# Patient Record
Sex: FEMALE | Race: OTHER
[De-identification: names, ages, dates, MRNs, and addresses within clinical notes are randomized per-mention and may not be internally consistent; named-entity substitution may affect disease eponyms.]

---

## 2020-01-10 ENCOUNTER — HOSPITAL ENCOUNTER (EMERGENCY)
Dept: HOSPITAL 87 - ER | Age: 30
LOS: 1 days | Discharge: HOME | End: 2020-01-11
Payer: MEDICARE

## 2020-01-10 VITALS — BODY MASS INDEX: 25.88 KG/M2 | WEIGHT: 180.78 LBS | HEIGHT: 70 IN

## 2020-01-10 DIAGNOSIS — F15.10: ICD-10-CM

## 2020-01-10 DIAGNOSIS — N39.0: ICD-10-CM

## 2020-01-10 DIAGNOSIS — E16.2: ICD-10-CM

## 2020-01-10 DIAGNOSIS — F23: Primary | ICD-10-CM

## 2020-01-10 LAB
AMPHETAMINES UR QL SCN: (no result)
APPEARANCE UR: (no result)
BARBITURATES UR QL SCN: NEGATIVE
BENZODIAZ UR QL SCN: (no result)
BZE UR QL SCN: NEGATIVE
CANNABINOIDS UR QL SCN: (no result)
CHLORIDE SERPL-SCNC: 105 MEQ/L (ref 98–107)
COLOR UR: YELLOW
EOSINOPHIL NFR BLD MANUAL: 1 % (ref 0–5)
ERYTHROCYTE [DISTWIDTH] IN BLOOD BY AUTOMATED COUNT: 15.9 % (ref 11.6–14.6)
ETHANOL SERPL-MCNC: < 10 MG/DL
HCG SERPL QL: NEGATIVE
HCT VFR BLD AUTO: 37.4 % (ref 36–48)
HGB BLD-MCNC: 12.3 G/DL (ref 12–16)
HGB UR QL STRIP: NEGATIVE
KETONES UR STRIP-MCNC: (no result) MG/DL
LEUKOCYTE ESTERASE UR QL STRIP: (no result)
LYMPHOCYTES NFR BLD MANUAL: 22 % (ref 20–60)
MCH RBC QN AUTO: 27.6 PG (ref 28–32)
MCV RBC AUTO: 84.3 FL (ref 81–99)
METHADONE UR QL SCN: NEGATIVE
MONOCYTES NFR BLD MANUAL: 23 % (ref 2–8)
NEUTS SEG NFR BLD MANUAL: 54 % (ref 45–75)
NITRITE UR QL STRIP: POSITIVE
OPIATES UR QL SCN: NEGATIVE
PCP UR QL SCN: NEGATIVE
PH UR STRIP: 7 [PH] (ref 4.5–8)
PLATELET # BLD AUTO: 258 X1000/UL (ref 130–400)
PLATELET # BLD EST: NORMAL 10*3/UL
PMV BLD AUTO: 8.8 FL (ref 7.4–10.4)
PROT UR QL STRIP: (no result)
RBC # BLD AUTO: 4.44 MILL/UL (ref 4.2–5.4)
SP GR UR STRIP: 1.02 (ref 1–1.03)
UROBILINOGEN UR STRIP-MCNC: 1 E.U./DL (ref 0.2–1)

## 2020-01-10 PROCEDURE — 80053 COMPREHEN METABOLIC PANEL: CPT

## 2020-01-10 PROCEDURE — G0480 DRUG TEST DEF 1-7 CLASSES: HCPCS

## 2020-01-10 PROCEDURE — 70450 CT HEAD/BRAIN W/O DYE: CPT

## 2020-01-10 PROCEDURE — 96372 THER/PROPH/DIAG INJ SC/IM: CPT

## 2020-01-10 PROCEDURE — 96365 THER/PROPH/DIAG IV INF INIT: CPT

## 2020-01-10 PROCEDURE — 85025 COMPLETE CBC W/AUTO DIFF WBC: CPT

## 2020-01-10 PROCEDURE — 93005 ELECTROCARDIOGRAM TRACING: CPT

## 2020-01-10 PROCEDURE — 80329 ANALGESICS NON-OPIOID 1 OR 2: CPT

## 2020-01-10 PROCEDURE — 84703 CHORIONIC GONADOTROPIN ASSAY: CPT

## 2020-01-10 PROCEDURE — 80320 DRUG SCREEN QUANTALCOHOLS: CPT

## 2020-01-10 PROCEDURE — 81003 URINALYSIS AUTO W/O SCOPE: CPT

## 2020-01-10 PROCEDURE — 96376 TX/PRO/DX INJ SAME DRUG ADON: CPT

## 2020-01-10 PROCEDURE — 80307 DRUG TEST PRSMV CHEM ANLYZR: CPT

## 2020-01-10 PROCEDURE — 82962 GLUCOSE BLOOD TEST: CPT

## 2020-01-10 PROCEDURE — 80305 DRUG TEST PRSMV DIR OPT OBS: CPT

## 2020-01-10 PROCEDURE — 36415 COLL VENOUS BLD VENIPUNCTURE: CPT

## 2020-01-10 PROCEDURE — 99284 EMERGENCY DEPT VISIT MOD MDM: CPT

## 2020-01-10 PROCEDURE — 96366 THER/PROPH/DIAG IV INF ADDON: CPT

## 2020-01-10 PROCEDURE — 96361 HYDRATE IV INFUSION ADD-ON: CPT

## 2020-01-10 PROCEDURE — 81025 URINE PREGNANCY TEST: CPT

## 2020-01-11 VITALS — SYSTOLIC BLOOD PRESSURE: 116 MMHG | DIASTOLIC BLOOD PRESSURE: 63 MMHG

## 2020-11-14 ENCOUNTER — HOSPITAL ENCOUNTER (EMERGENCY)
Dept: HOSPITAL 54 - ER | Age: 30
Discharge: HOME | End: 2020-11-14
Payer: SELF-PAY

## 2020-11-14 VITALS — HEIGHT: 64 IN | BODY MASS INDEX: 23.9 KG/M2 | WEIGHT: 140 LBS

## 2020-11-14 VITALS — DIASTOLIC BLOOD PRESSURE: 78 MMHG | SYSTOLIC BLOOD PRESSURE: 131 MMHG

## 2020-11-14 DIAGNOSIS — F20.9: ICD-10-CM

## 2020-11-14 DIAGNOSIS — Z59.0: ICD-10-CM

## 2020-11-14 DIAGNOSIS — F19.10: Primary | ICD-10-CM

## 2020-11-14 SDOH — ECONOMIC STABILITY - HOUSING INSECURITY: HOMELESSNESS: Z59.0

## 2020-11-14 NOTE — NUR
PT BIBRA C/O BEHAVIORAL ISSUES. PER REPORT, PT HAS HX OF SCHIZOPHRENIA AND HER 
NIECE CALLED 911 BECAUSE SHE WAS WALKING IN THE MIDDLE OF THE STREET WITH 
TRAFFIC. PT DENIES ANY SI OR HI. VS CHECKED. AWAITING MD [place].

## 2020-11-14 NOTE — NUR
PT REFUSING BLOOD DRAW OR ANY STUDIES.  DR SAENZ MADE AWARE. PT WANTS TO 
LEAVE ED.  REFUSING TO SIGN ACI AND HOMELESS WAIVER DISCHARGE.

## 2020-11-16 ENCOUNTER — HOSPITAL ENCOUNTER (EMERGENCY)
Dept: HOSPITAL 54 - ER | Age: 30
LOS: 1 days | Discharge: PSYCHIATRIC OR SUBSTANCE USE FACILITY | End: 2020-11-17
Payer: COMMERCIAL

## 2020-11-16 VITALS — BODY MASS INDEX: 23.9 KG/M2 | WEIGHT: 140 LBS | HEIGHT: 64 IN

## 2020-11-16 DIAGNOSIS — F19.10: ICD-10-CM

## 2020-11-16 DIAGNOSIS — N30.00: ICD-10-CM

## 2020-11-16 DIAGNOSIS — Z20.828: ICD-10-CM

## 2020-11-16 DIAGNOSIS — F20.9: ICD-10-CM

## 2020-11-16 DIAGNOSIS — R45.851: Primary | ICD-10-CM

## 2020-11-16 LAB
ALBUMIN SERPL BCP-MCNC: 3.3 G/DL (ref 3.4–5)
ALP SERPL-CCNC: 87 U/L (ref 46–116)
ALT SERPL W P-5'-P-CCNC: 15 U/L (ref 12–78)
APAP SERPL-MCNC: < 2 UG/ML (ref 10–30)
AST SERPL W P-5'-P-CCNC: 14 U/L (ref 15–37)
BASOPHILS # BLD AUTO: 0 /CMM (ref 0–0.2)
BASOPHILS NFR BLD AUTO: 0.3 % (ref 0–2)
BILIRUB DIRECT SERPL-MCNC: 0.2 MG/DL (ref 0–0.2)
BILIRUB SERPL-MCNC: 0.7 MG/DL (ref 0.2–1)
BILIRUB UR QL STRIP: (no result)
BUN SERPL-MCNC: 16 MG/DL (ref 7–18)
CALCIUM SERPL-MCNC: 8 MG/DL (ref 8.5–10.1)
CHLORIDE SERPL-SCNC: 102 MMOL/L (ref 98–107)
CO2 SERPL-SCNC: 23 MMOL/L (ref 21–32)
COLOR UR: YELLOW
CREAT SERPL-MCNC: 1 MG/DL (ref 0.6–1.3)
EOSINOPHIL NFR BLD AUTO: 1.8 % (ref 0–6)
ETHANOL SERPL-MCNC: 4 MG/DL (ref 0–0)
GLUCOSE SERPL-MCNC: 164 MG/DL (ref 74–106)
HCT VFR BLD AUTO: 39 % (ref 33–45)
HGB BLD-MCNC: 12.4 G/DL (ref 11.5–14.8)
LYMPHOCYTES NFR BLD AUTO: 2.4 /CMM (ref 0.8–4.8)
LYMPHOCYTES NFR BLD AUTO: 35.3 % (ref 20–44)
MCHC RBC AUTO-ENTMCNC: 32 G/DL (ref 31–36)
MCV RBC AUTO: 85 FL (ref 82–100)
MONOCYTES NFR BLD AUTO: 0.7 /CMM (ref 0.1–1.3)
MONOCYTES NFR BLD AUTO: 9.8 % (ref 2–12)
NEUTROPHILS # BLD AUTO: 3.7 /CMM (ref 1.8–8.9)
NEUTROPHILS NFR BLD AUTO: 52.8 % (ref 43–81)
PH UR STRIP: 6.5 [PH] (ref 5–8)
PLATELET # BLD AUTO: 290 /CMM (ref 150–450)
POTASSIUM SERPL-SCNC: 3.5 MMOL/L (ref 3.5–5.1)
PROT SERPL-MCNC: 7.4 G/DL (ref 6.4–8.2)
PROT UR QL STRIP: 30 MG/DL
RBC # BLD AUTO: 4.52 MIL/UL (ref 4–5.2)
RBC #/AREA URNS HPF: (no result) /HPF (ref 0–2)
SODIUM SERPL-SCNC: 138 MMOL/L (ref 136–145)
UROBILINOGEN UR STRIP-MCNC: 4 EU/DL
WBC #/AREA URNS HPF: (no result) /HPF
WBC #/AREA URNS HPF: (no result) /HPF (ref 0–3)
WBC NRBC COR # BLD AUTO: 6.9 K/UL (ref 4.3–11)

## 2020-11-16 PROCEDURE — 87186 SC STD MICRODIL/AGAR DIL: CPT

## 2020-11-16 PROCEDURE — 85025 COMPLETE CBC W/AUTO DIFF WBC: CPT

## 2020-11-16 PROCEDURE — 81001 URINALYSIS AUTO W/SCOPE: CPT

## 2020-11-16 PROCEDURE — 80076 HEPATIC FUNCTION PANEL: CPT

## 2020-11-16 PROCEDURE — 87426 SARSCOV CORONAVIRUS AG IA: CPT

## 2020-11-16 PROCEDURE — G0480 DRUG TEST DEF 1-7 CLASSES: HCPCS

## 2020-11-16 PROCEDURE — 80048 BASIC METABOLIC PNL TOTAL CA: CPT

## 2020-11-16 PROCEDURE — 80307 DRUG TEST PRSMV CHEM ANLYZR: CPT

## 2020-11-16 PROCEDURE — 87086 URINE CULTURE/COLONY COUNT: CPT

## 2020-11-16 PROCEDURE — 80320 DRUG SCREEN QUANTALCOHOLS: CPT

## 2020-11-16 PROCEDURE — 99285 EMERGENCY DEPT VISIT HI MDM: CPT

## 2020-11-16 PROCEDURE — 36415 COLL VENOUS BLD VENIPUNCTURE: CPT

## 2020-11-16 PROCEDURE — 96372 THER/PROPH/DIAG INJ SC/IM: CPT

## 2020-11-16 PROCEDURE — 80299 QUANTITATIVE ASSAY DRUG: CPT

## 2020-11-16 PROCEDURE — C9803 HOPD COVID-19 SPEC COLLECT: HCPCS

## 2020-11-16 PROCEDURE — 87077 CULTURE AEROBIC IDENTIFY: CPT

## 2020-11-16 PROCEDURE — 84703 CHORIONIC GONADOTROPIN ASSAY: CPT

## 2020-11-16 NOTE — NUR
Patient came in to the er c/o suicidal thoughts, "i want to run into traffic", 
-HI. On room air, breathing evenly and unlabored. Kept comfortable, will 
continue to monitor accordingly. Sitter at bedside for constant monitoring.

## 2020-11-17 VITALS — DIASTOLIC BLOOD PRESSURE: 74 MMHG | SYSTOLIC BLOOD PRESSURE: 128 MMHG

## 2020-11-17 NOTE — NUR
CALLED CALL THE [facility] FOR TRANSP[facility]ATION [facility]RES[facility] [id number][facility] 
ETA [phone]

## 2020-11-17 NOTE — NUR
PT ACCEPTED TO [facility]

DORA MD: DR. BEATTY/ DR. GARCIA

NUMBER FOR REPORT: [phone] EXT [id number]

-------------------------------------------------------------------------------

Addendum: 11/17/20 at 0344 by AMADO

-------------------------------------------------------------------------------

BED ASSIGNMENT [id number]